# Patient Record
Sex: FEMALE | Race: WHITE | NOT HISPANIC OR LATINO | Employment: FULL TIME | URBAN - METROPOLITAN AREA
[De-identification: names, ages, dates, MRNs, and addresses within clinical notes are randomized per-mention and may not be internally consistent; named-entity substitution may affect disease eponyms.]

---

## 2017-04-21 ENCOUNTER — OFFICE VISIT (OUTPATIENT)
Dept: FAMILY MEDICINE CLINIC | Facility: CLINIC | Age: 32
End: 2017-04-21

## 2017-04-21 VITALS
OXYGEN SATURATION: 99 % | HEART RATE: 78 BPM | WEIGHT: 219 LBS | HEIGHT: 67 IN | DIASTOLIC BLOOD PRESSURE: 72 MMHG | SYSTOLIC BLOOD PRESSURE: 124 MMHG | BODY MASS INDEX: 34.37 KG/M2 | RESPIRATION RATE: 16 BRPM

## 2017-04-21 DIAGNOSIS — E66.9 OBESITY (BMI 30-39.9): ICD-10-CM

## 2017-04-21 DIAGNOSIS — T78.40XA ALLERGIC REACTION, INITIAL ENCOUNTER: Primary | ICD-10-CM

## 2017-04-21 DIAGNOSIS — L30.8 OTHER ECZEMA: ICD-10-CM

## 2017-04-21 LAB
ALBUMIN SERPL-MCNC: 4.2 G/DL (ref 3.2–4.8)
ALBUMIN/GLOB SERPL: 1.4 G/DL (ref 1.5–2.5)
ALP SERPL-CCNC: 122 U/L (ref 25–100)
ALT SERPL W P-5'-P-CCNC: 19 U/L (ref 7–40)
ANION GAP SERPL CALCULATED.3IONS-SCNC: 8 MMOL/L (ref 3–11)
ARTICHOKE IGE QN: 119 MG/DL (ref 0–130)
AST SERPL-CCNC: 25 U/L (ref 0–33)
BASOPHILS # BLD AUTO: 0.01 10*3/MM3 (ref 0–0.2)
BASOPHILS NFR BLD AUTO: 0.2 % (ref 0–1)
BILIRUB SERPL-MCNC: 0.5 MG/DL (ref 0.3–1.2)
BUN BLD-MCNC: 12 MG/DL (ref 9–23)
BUN/CREAT SERPL: 13.3 (ref 7–25)
CALCIUM SPEC-SCNC: 10.1 MG/DL (ref 8.7–10.4)
CHLORIDE SERPL-SCNC: 105 MMOL/L (ref 99–109)
CHOLEST SERPL-MCNC: 174 MG/DL (ref 0–200)
CO2 SERPL-SCNC: 28 MMOL/L (ref 20–31)
CREAT BLD-MCNC: 0.9 MG/DL (ref 0.6–1.3)
DEPRECATED RDW RBC AUTO: 43.6 FL (ref 37–54)
EOSINOPHIL # BLD AUTO: 0.1 10*3/MM3 (ref 0.1–0.3)
EOSINOPHIL NFR BLD AUTO: 2.3 % (ref 0–3)
ERYTHROCYTE [DISTWIDTH] IN BLOOD BY AUTOMATED COUNT: 13.3 % (ref 11.3–14.5)
GFR SERPL CREATININE-BSD FRML MDRD: 72 ML/MIN/1.73
GLOBULIN UR ELPH-MCNC: 3.1 GM/DL
GLUCOSE BLD-MCNC: 76 MG/DL (ref 70–100)
HCT VFR BLD AUTO: 39.9 % (ref 34.5–44)
HDLC SERPL-MCNC: 49 MG/DL (ref 40–60)
HGB BLD-MCNC: 12.7 G/DL (ref 11.5–15.5)
IMM GRANULOCYTES # BLD: 0 10*3/MM3 (ref 0–0.03)
IMM GRANULOCYTES NFR BLD: 0 % (ref 0–0.6)
LYMPHOCYTES # BLD AUTO: 1.86 10*3/MM3 (ref 0.6–4.8)
LYMPHOCYTES NFR BLD AUTO: 42 % (ref 24–44)
MCH RBC QN AUTO: 28.7 PG (ref 27–31)
MCHC RBC AUTO-ENTMCNC: 31.8 G/DL (ref 32–36)
MCV RBC AUTO: 90.3 FL (ref 80–99)
MONOCYTES # BLD AUTO: 0.41 10*3/MM3 (ref 0–1)
MONOCYTES NFR BLD AUTO: 9.3 % (ref 0–12)
NEUTROPHILS # BLD AUTO: 2.05 10*3/MM3 (ref 1.5–8.3)
NEUTROPHILS NFR BLD AUTO: 46.2 % (ref 41–71)
PLATELET # BLD AUTO: 234 10*3/MM3 (ref 150–450)
PMV BLD AUTO: 12.5 FL (ref 6–12)
POTASSIUM BLD-SCNC: 4.5 MMOL/L (ref 3.5–5.5)
PROT SERPL-MCNC: 7.3 G/DL (ref 5.7–8.2)
RBC # BLD AUTO: 4.42 10*6/MM3 (ref 3.89–5.14)
SODIUM BLD-SCNC: 141 MMOL/L (ref 132–146)
TRIGL SERPL-MCNC: 39 MG/DL (ref 0–150)
TSH SERPL DL<=0.05 MIU/L-ACNC: 0.79 MIU/ML (ref 0.35–5.35)
WBC NRBC COR # BLD: 4.43 10*3/MM3 (ref 3.5–10.8)

## 2017-04-21 PROCEDURE — 85025 COMPLETE CBC W/AUTO DIFF WBC: CPT | Performed by: FAMILY MEDICINE

## 2017-04-21 PROCEDURE — 99204 OFFICE O/P NEW MOD 45 MIN: CPT | Performed by: FAMILY MEDICINE

## 2017-04-21 PROCEDURE — 80061 LIPID PANEL: CPT | Performed by: FAMILY MEDICINE

## 2017-04-21 PROCEDURE — 84443 ASSAY THYROID STIM HORMONE: CPT | Performed by: FAMILY MEDICINE

## 2017-04-21 PROCEDURE — 36415 COLL VENOUS BLD VENIPUNCTURE: CPT | Performed by: FAMILY MEDICINE

## 2017-04-21 PROCEDURE — 80053 COMPREHEN METABOLIC PANEL: CPT | Performed by: FAMILY MEDICINE

## 2017-05-19 ENCOUNTER — OFFICE VISIT (OUTPATIENT)
Dept: FAMILY MEDICINE CLINIC | Facility: CLINIC | Age: 32
End: 2017-05-19

## 2017-05-19 VITALS
SYSTOLIC BLOOD PRESSURE: 112 MMHG | HEIGHT: 67 IN | OXYGEN SATURATION: 98 % | BODY MASS INDEX: 33.59 KG/M2 | DIASTOLIC BLOOD PRESSURE: 72 MMHG | WEIGHT: 214 LBS | HEART RATE: 70 BPM | TEMPERATURE: 97.9 F

## 2017-05-19 DIAGNOSIS — E66.1 MORBID DRUG-INDUCED OBESITY: Primary | ICD-10-CM

## 2017-05-19 PROCEDURE — 99213 OFFICE O/P EST LOW 20 MIN: CPT | Performed by: FAMILY MEDICINE

## 2017-05-19 RX ORDER — NALTREXONE HYDROCHLORIDE AND BUPROPION HYDROCHLORIDE 8; 90 MG/1; MG/1
2 TABLET, EXTENDED RELEASE ORAL 2 TIMES DAILY
Qty: 120 TABLET | Refills: 0 | Status: SHIPPED | OUTPATIENT
Start: 2017-05-19 | End: 2017-09-25

## 2017-05-19 RX ORDER — NALTREXONE HYDROCHLORIDE AND BUPROPION HYDROCHLORIDE 8; 90 MG/1; MG/1
TABLET, EXTENDED RELEASE ORAL
Refills: 0 | COMMUNITY
Start: 2017-04-21 | End: 2017-05-19 | Stop reason: SDUPTHER

## 2017-09-25 ENCOUNTER — OFFICE VISIT (OUTPATIENT)
Dept: ORTHOPEDIC SURGERY | Facility: CLINIC | Age: 32
End: 2017-09-25

## 2017-09-25 VITALS
BODY MASS INDEX: 33.12 KG/M2 | HEIGHT: 67 IN | SYSTOLIC BLOOD PRESSURE: 127 MMHG | WEIGHT: 211 LBS | DIASTOLIC BLOOD PRESSURE: 92 MMHG | HEART RATE: 61 BPM

## 2017-09-25 DIAGNOSIS — M79.672 LEFT FOOT PAIN: Primary | ICD-10-CM

## 2017-09-25 PROCEDURE — 99203 OFFICE O/P NEW LOW 30 MIN: CPT | Performed by: ORTHOPAEDIC SURGERY

## 2017-09-25 NOTE — PROGRESS NOTES
NEW PATIENT    Patient: Theresa Matthew  : 1985    Primary Care Provider: Ellen Hooper DO    Requesting Provider: As above    Pain of the Left Foot      History    Chief Complaint: Left foot pain    History of Present Illness: This is a very pleasant 32-year-old woman who reports that 5 months ago she stepped on broken glass at her home.  It was a broken glass from a picture frame.  She stepped on it on the plantar lateral aspect of the left foot.  She pulled out a big piece of glass, but then was unable to pull out a very small piece that she thinks worked into the foot.  Since that time she's had pain and a hard callused area on the plantar lateral aspect of the foot.  She has more pain with activity, better with rest.  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  She rates the pain at a 4 out of 10.  She has not had any other specific treatment.  No fever no chills, no drainage from the area.  Her tetanus booster is up-to-date.  No other injuries.    Current Outpatient Prescriptions on File Prior to Visit   Medication Sig Dispense Refill   • [DISCONTINUED] CONTRAVE 8-90 MG tablet Take 2 tablets by mouth 2 (Two) Times a Day. 120 tablet 0   • [DISCONTINUED] fluocinonide-emollient (LIDEX-E) 0.05 % cream Apply  topically 2 (Two) Times a Day. 60 g 0     No current facility-administered medications on file prior to visit.       No Known Allergies   Past Medical History:   Diagnosis Date   • Cancer     cervical      Past Surgical History:   Procedure Laterality Date   • CATARACT EXTRACTION     •  SECTION     • WISDOM TOOTH EXTRACTION       Family History   Problem Relation Age of Onset   • Diabetes Mother    • Stroke Father    • Heart attack Father       Social History     Social History   • Marital status:      Spouse name: N/A   • Number of children: N/A   • Years of education: N/A     Occupational History   • Not on file.     Social History Main Topics   • Smoking status: Never  "Smoker   • Smokeless tobacco: Never Used   • Alcohol use No   • Drug use: No   • Sexual activity: Defer     Other Topics Concern   • Not on file     Social History Narrative        Review of Systems   Constitutional: Positive for activity change.   HENT: Negative.    Eyes: Negative.    Respiratory: Negative.    Cardiovascular: Negative.    Gastrointestinal: Negative.    Endocrine: Negative.    Genitourinary: Negative.    Musculoskeletal: Positive for arthralgias.   Skin: Negative.    Allergic/Immunologic: Negative.    Neurological: Negative.    Hematological: Negative.    Psychiatric/Behavioral: Negative.        The following portions of the patient's history were reviewed and updated as appropriate: allergies, current medications, past family history, past medical history, past social history, past surgical history and problem list.    Physical Exam:   /92  Pulse 61  Ht 66.93\" (170 cm)  Wt 211 lb (95.7 kg)  BMI 33.12 kg/m2  GENERAL: Body habitus: obese    Lower extremity edema: Left: none; Right: none    Varicose veins:  Left: none; Right: none    Gait: antalgic     Mental Status:  awake and alert; oriented to person, place, and time    Voice:  clear  SKIN:  Normal and warm and dry    Hair Growth:  Right:normal; Left:  normal  NAILS: Toenails: normal  HEENT: Head: Normocephalic, atraumatic,  without obvious abnormality.  eye: normal external eye, no icterus  ears: normal external ears  nose: normal external nose  pharynx: dental hygiene adequate  PULM:  Repiratory effort normal  CV:  Dorsalis Pedis:  Right: 2+; Left:2+    Posterior Tibial: Right:2+; Left:2+    Capillary Refill:  Brisk  MSK:  Hand:right handed      Tibia:  Right:  non tender; Left:  non tender      Ankle:  Right: non tender; Left:  non tender      Foot:  Right:  non tender and Moderate calluses under the second and fifth metatarsal head, under the great toe, but nontender; Left:  Some calluses under the second metatarsal head and great " toe, but not tender.  Large and very tender thick keratotic lesion under the fifth metatarsal just distal to the base of the fifth metatarsal.  No signs of infection, no erythema, I cannot palpate any foreign body.  No swelling in the area.      NEURO: ity sensation: intact       Calf Atrophy:none    Motor Function: all 5/5         Medical Decision Making    Data Review:   ordered and reviewed x-rays today    Assessment and Plan/ Diagnosis/Treatment options:   1. Left foot pain  I did an x-ray today I don't see any definite foreign body.  I also debrided the keratotic area extensively after a sterile prep with alcohol.  I used a scalpel and pickups.  Sometimes in the very thick keratotic layer I thought I reminded seen some shiny bits that might have been glass.  I debrided it all the way down to normal skin tissue.  I did not draw any blood, this was all thick keratin.  I did not see any punctate lesions, although this could be a plantar wart.  It was very tender to palpation, but I did not palpate any hard foreign body.  I carefully scraped the area with the scalpel and did not feel anything felt like glass.  I did not see any dark structures -nothing that looks like a foreign body.  I removed all of the callused area.  I explained to her that before considering surgery I would do an MRI to see if there is anything that looks like a foreign body in the area.  I explained that sometimes glass does show up on x-ray especially if it has a lead content, sometimes it does not.  If it's a very small piece, surgery to find it can be extraordinarily difficult.  Even if it's a large piece surgery can be difficult in the foot.  Sometimes I have operated to look for a piece of glass and even after extensive exploration through the tissues it keeps eluding you.  Before we make any type of surgical decision I think we need more information.  Moreover, it's possible this is a normal callus or even a plantar wart, not a foreign  body response.  She is insistent that it's glass, but I want to be cautious and not make her worse with surgical intervention.  I will see her back following the MRI.  - XR Foot 2 View Left; Future  - MRI Foot Left Without Contrast; Future

## 2018-08-29 ENCOUNTER — OFFICE VISIT (OUTPATIENT)
Dept: FAMILY MEDICINE CLINIC | Facility: CLINIC | Age: 33
End: 2018-08-29

## 2018-08-29 VITALS
WEIGHT: 213 LBS | TEMPERATURE: 97.2 F | HEART RATE: 66 BPM | OXYGEN SATURATION: 98 % | BODY MASS INDEX: 33.43 KG/M2 | RESPIRATION RATE: 16 BRPM | HEIGHT: 67 IN | DIASTOLIC BLOOD PRESSURE: 68 MMHG | SYSTOLIC BLOOD PRESSURE: 98 MMHG

## 2018-08-29 DIAGNOSIS — R10.11 RIGHT UPPER QUADRANT ABDOMINAL PAIN: Primary | ICD-10-CM

## 2018-08-29 DIAGNOSIS — R11.0 NAUSEA: ICD-10-CM

## 2018-08-29 LAB
ALBUMIN SERPL-MCNC: 4.34 G/DL (ref 3.2–4.8)
ALBUMIN/GLOB SERPL: 1.4 G/DL (ref 1.5–2.5)
ALP SERPL-CCNC: 101 U/L (ref 25–100)
ALT SERPL W P-5'-P-CCNC: 20 U/L (ref 7–40)
ANION GAP SERPL CALCULATED.3IONS-SCNC: 3 MMOL/L (ref 3–11)
AST SERPL-CCNC: 23 U/L (ref 0–33)
BASOPHILS # BLD AUTO: 0.01 10*3/MM3 (ref 0–0.2)
BASOPHILS NFR BLD AUTO: 0.2 % (ref 0–1)
BILIRUB SERPL-MCNC: 0.6 MG/DL (ref 0.3–1.2)
BUN BLD-MCNC: 14 MG/DL (ref 9–23)
BUN/CREAT SERPL: 13.6 (ref 7–25)
CALCIUM SPEC-SCNC: 9.3 MG/DL (ref 8.7–10.4)
CHLORIDE SERPL-SCNC: 107 MMOL/L (ref 99–109)
CO2 SERPL-SCNC: 29 MMOL/L (ref 20–31)
CREAT BLD-MCNC: 1.03 MG/DL (ref 0.6–1.3)
DEPRECATED RDW RBC AUTO: 44.7 FL (ref 37–54)
EOSINOPHIL # BLD AUTO: 0.09 10*3/MM3 (ref 0–0.3)
EOSINOPHIL NFR BLD AUTO: 2.2 % (ref 0–3)
ERYTHROCYTE [DISTWIDTH] IN BLOOD BY AUTOMATED COUNT: 13.7 % (ref 11.3–14.5)
GFR SERPL CREATININE-BSD FRML MDRD: 61 ML/MIN/1.73
GLOBULIN UR ELPH-MCNC: 3.1 GM/DL
GLUCOSE BLD-MCNC: 86 MG/DL (ref 70–100)
HAV IGM SERPL QL IA: NORMAL
HBV CORE IGM SERPL QL IA: NORMAL
HBV SURFACE AG SERPL QL IA: NORMAL
HCT VFR BLD AUTO: 41.4 % (ref 34.5–44)
HCV AB SER DONR QL: NORMAL
HGB BLD-MCNC: 13.2 G/DL (ref 11.5–15.5)
IMM GRANULOCYTES # BLD: 0 10*3/MM3 (ref 0–0.03)
IMM GRANULOCYTES NFR BLD: 0 % (ref 0–0.6)
LIPASE SERPL-CCNC: 52 U/L (ref 6–51)
LYMPHOCYTES # BLD AUTO: 1.64 10*3/MM3 (ref 0.6–4.8)
LYMPHOCYTES NFR BLD AUTO: 40.4 % (ref 24–44)
MCH RBC QN AUTO: 28.9 PG (ref 27–31)
MCHC RBC AUTO-ENTMCNC: 31.9 G/DL (ref 32–36)
MCV RBC AUTO: 90.8 FL (ref 80–99)
MONOCYTES # BLD AUTO: 0.3 10*3/MM3 (ref 0–1)
MONOCYTES NFR BLD AUTO: 7.4 % (ref 0–12)
NEUTROPHILS # BLD AUTO: 2.02 10*3/MM3 (ref 1.5–8.3)
NEUTROPHILS NFR BLD AUTO: 49.8 % (ref 41–71)
PLATELET # BLD AUTO: 216 10*3/MM3 (ref 150–450)
PMV BLD AUTO: 13.1 FL (ref 6–12)
POTASSIUM BLD-SCNC: 4.7 MMOL/L (ref 3.5–5.5)
PROT SERPL-MCNC: 7.4 G/DL (ref 5.7–8.2)
RBC # BLD AUTO: 4.56 10*6/MM3 (ref 3.89–5.14)
SODIUM BLD-SCNC: 139 MMOL/L (ref 132–146)
WBC NRBC COR # BLD: 4.06 10*3/MM3 (ref 3.5–10.8)

## 2018-08-29 PROCEDURE — 80074 ACUTE HEPATITIS PANEL: CPT | Performed by: NURSE PRACTITIONER

## 2018-08-29 PROCEDURE — 36415 COLL VENOUS BLD VENIPUNCTURE: CPT | Performed by: NURSE PRACTITIONER

## 2018-08-29 PROCEDURE — 99214 OFFICE O/P EST MOD 30 MIN: CPT | Performed by: NURSE PRACTITIONER

## 2018-08-29 PROCEDURE — 85025 COMPLETE CBC W/AUTO DIFF WBC: CPT | Performed by: NURSE PRACTITIONER

## 2018-08-29 PROCEDURE — 83690 ASSAY OF LIPASE: CPT | Performed by: NURSE PRACTITIONER

## 2018-08-29 PROCEDURE — 80053 COMPREHEN METABOLIC PANEL: CPT | Performed by: NURSE PRACTITIONER

## 2018-08-29 NOTE — PROGRESS NOTES
Subjective   Theresa Matthew is a 33 y.o. female.     Abdominal Pain   This is a new problem. Episode onset: 6 months ago. The onset quality is gradual. The problem occurs intermittently. The problem has been gradually worsening. The pain is located in the RUQ. The pain is moderate. The quality of the pain is aching, colicky, cramping and a sensation of fullness. The abdominal pain radiates to the epigastric region. Associated symptoms include nausea and vomiting. Pertinent negatives include no arthralgias, constipation, diarrhea, dysuria, fever, frequency, headaches, hematochezia, hematuria, melena or myalgias.       The following portions of the patient's history were reviewed and updated as appropriate: allergies, current medications, past family history, past medical history, past social history, past surgical history and problem list.     No Known Allergies     Review of Systems   Constitutional: Positive for appetite change. Negative for chills, diaphoresis, fatigue, fever and unexpected weight change.   HENT: Negative.    Respiratory: Negative.    Cardiovascular: Negative.    Gastrointestinal: Positive for abdominal distention, abdominal pain, nausea and vomiting. Negative for blood in stool, constipation, diarrhea, hematochezia and melena.   Genitourinary: Negative for dysuria, flank pain, frequency, hematuria and pelvic pain.   Musculoskeletal: Negative for arthralgias and myalgias.   Skin: Negative for rash.   Neurological: Negative for dizziness and headaches.       Objective   Physical Exam   Constitutional: She is oriented to person, place, and time. Vital signs are normal. She appears well-developed and well-nourished. She is cooperative. She does not appear ill. No distress.   HENT:   Mouth/Throat: Uvula is midline and mucous membranes are normal.   Eyes: No scleral icterus.   Cardiovascular: Normal rate, regular rhythm and normal heart sounds.    Pulmonary/Chest: Effort normal and breath sounds  normal.   Abdominal: Soft. Normal appearance and bowel sounds are normal. She exhibits no distension and no mass. There is no hepatosplenomegaly. There is tenderness in the right upper quadrant. There is no rigidity, no rebound, no guarding, no CVA tenderness, no tenderness at McBurney's point and negative Franz's sign.   Neurological: She is alert and oriented to person, place, and time.   Skin: Skin is warm, dry and intact. No rash noted. She is not diaphoretic.   Psychiatric: She has a normal mood and affect. Her behavior is normal. Judgment and thought content normal.   Vitals reviewed.    Vitals:    08/29/18 0848   BP: 98/68   Pulse: 66   Resp: 16   Temp: 97.2 °F (36.2 °C)   SpO2: 98%   Body mass index is 33.43 kg/m².     Assessment/Plan   Theresa was seen today for pain.    Diagnoses and all orders for this visit:    Right upper quadrant abdominal pain  -     US Gallbladder; Future  -     NM HIDA Scan With Pharmacological Intervention; Future  -     Comprehensive Metabolic Panel  -     CBC Auto Differential  -     Hepatitis Panel, Acute  -     Lipase    Nausea  -     US Gallbladder; Future  -     NM HIDA Scan With Pharmacological Intervention; Future  -     Comprehensive Metabolic Panel  -     CBC Auto Differential  -     Hepatitis Panel, Acute  -     Lipase       Discussed the nature of the medical condition(s) risks, complications, implications, management, safe and proper use of medications. Encouraged medication compliance, and keeping scheduled follow up appointments with me and any other providers.   Laboratory testing ordered today. Further recommendations after lab evaluation.

## 2018-09-12 ENCOUNTER — HOSPITAL ENCOUNTER (OUTPATIENT)
Dept: NUCLEAR MEDICINE | Facility: HOSPITAL | Age: 33
Discharge: HOME | End: 2018-09-12

## 2018-09-12 ENCOUNTER — HOSPITAL ENCOUNTER (OUTPATIENT)
Dept: ULTRASOUND IMAGING | Facility: HOSPITAL | Age: 33
Discharge: HOME | End: 2018-09-12
Admitting: NURSE PRACTITIONER

## 2018-09-12 DIAGNOSIS — R11.0 NAUSEA: ICD-10-CM

## 2018-09-12 DIAGNOSIS — R10.11 RIGHT UPPER QUADRANT ABDOMINAL PAIN: ICD-10-CM

## 2018-09-12 PROCEDURE — 25010000002 SINCALIDE PER 5 MCG: Performed by: NURSE PRACTITIONER

## 2018-09-12 PROCEDURE — 78227 HEPATOBIL SYST IMAGE W/DRUG: CPT

## 2018-09-12 PROCEDURE — A9537 TC99M MEBROFENIN: HCPCS | Performed by: NURSE PRACTITIONER

## 2018-09-12 PROCEDURE — 76705 ECHO EXAM OF ABDOMEN: CPT

## 2018-09-12 PROCEDURE — 0 TECHNETIUM TC 99M MEBROFENIN KIT: Performed by: NURSE PRACTITIONER

## 2018-09-12 RX ORDER — KIT FOR THE PREPARATION OF TECHNETIUM TC 99M MEBROFENIN 45 MG/10ML
1 INJECTION, POWDER, LYOPHILIZED, FOR SOLUTION INTRAVENOUS
Status: COMPLETED | OUTPATIENT
Start: 2018-09-12 | End: 2018-09-12

## 2018-09-12 RX ADMIN — MEBROFENIN 1 DOSE: 45 INJECTION, POWDER, LYOPHILIZED, FOR SOLUTION INTRAVENOUS at 09:25

## 2018-09-12 RX ADMIN — SINCALIDE 1.9 MCG: 5 INJECTION, POWDER, LYOPHILIZED, FOR SOLUTION INTRAVENOUS at 10:23

## 2018-09-13 ENCOUNTER — TELEPHONE (OUTPATIENT)
Dept: FAMILY MEDICINE CLINIC | Facility: CLINIC | Age: 33
End: 2018-09-13

## 2018-09-13 DIAGNOSIS — K82.8 BILIARY DYSKINESIA: Primary | ICD-10-CM

## 2018-09-13 DIAGNOSIS — R94.8 ABNORMAL BILIARY HIDA SCAN: ICD-10-CM

## 2018-09-13 NOTE — TELEPHONE ENCOUNTER
Spoke with patient, informed of gallbladder ultrasound and hida scan and discussed findings, questions answered. Recommended surgery consult due to gallbladder function of 5%. Patient agreeable with plan of care. Referral placed.

## 2020-08-31 ENCOUNTER — TELEPHONE (OUTPATIENT)
Dept: FAMILY MEDICINE CLINIC | Facility: CLINIC | Age: 35
End: 2020-08-31

## 2020-08-31 NOTE — TELEPHONE ENCOUNTER
PATIENT CALLING IN TO REPORT SYMPTOMS OF CHEST/NECK ITCHINESS, REDNESS, RASH, SEVERAL LESIONS.     SHE IS REQUESTING A TOPICAL OINTMENT OR CREAM SENT TO THE PHARMACY.    [facility]/pharmacy #[id number] - [place], [place] - [address]  - [phone]  - [phone]    CALLBACK NUMBER [phone]

## 2020-09-01 ENCOUNTER — TELEMEDICINE (OUTPATIENT)
Dept: FAMILY MEDICINE CLINIC | Facility: CLINIC | Age: 35
End: 2020-09-01

## 2020-09-01 DIAGNOSIS — R21 RASH: Primary | ICD-10-CM

## 2020-09-01 PROCEDURE — 99213 OFFICE O/P EST LOW 20 MIN: CPT | Performed by: NURSE PRACTITIONER

## 2020-09-01 RX ORDER — METHYLPREDNISOLONE 4 MG/1
TABLET ORAL
Qty: 21 TABLET | Refills: 0 | Status: SHIPPED | OUTPATIENT
Start: 2020-09-01

## 2020-09-01 NOTE — PROGRESS NOTES
Irena Matthew is a 35 y.o. female.       This was an audio and video enabled telemedicine encounter.    History of Present Illness  Complains of pruritic rash on chest and neck. Started on Phentermine June 1st. Developed mild rash on chest in July. Rash was pruritic.   Rash never fully resolved and progressed.  She stopped the phentermine on Friday.  Phentermine prescribed by Maribel at [facility]. Rash is described as red with a raised center. Denies bed bugs. No bed partners. No pets in the bed. No new detergents or soaps.    Outpatient Encounter Medications as of 9/1/2020   Medication Sig Dispense Refill   • methylPREDNISolone (MEDROL) 4 MG dose pack Take as directed on package instructions. 21 tablet 0     No facility-administered encounter medications on file as of 9/1/2020.        The following portions of the patient's history were reviewed and updated as appropriate: allergies, current medications, past family history, past medical history, past social history, past surgical history and problem list.    Review of Systems   Constitutional: Negative for appetite change, fever, unexpected weight gain and unexpected weight loss.   HENT: Negative for congestion, nosebleeds, sore throat and trouble swallowing.    Eyes: Negative for visual disturbance.   Respiratory: Negative for cough, shortness of breath and wheezing.    Cardiovascular: Negative for chest pain, palpitations and leg swelling.   Gastrointestinal: Negative for abdominal pain, blood in stool, constipation, diarrhea, nausea and vomiting.   Endocrine: Negative for polydipsia, polyphagia and polyuria.   Genitourinary: Negative for dysuria, frequency and hematuria.   Musculoskeletal: Negative for arthralgias, joint swelling and myalgias.   Skin: Positive for rash.   Neurological: Negative for dizziness, seizures, syncope and numbness.   Hematological: Negative for adenopathy. Does not bruise/bleed easily.    Psychiatric/Behavioral: Negative for behavioral problems, sleep disturbance and depressed mood. The patient is not nervous/anxious.        Objective     There were no vitals taken for this visit.    Physical Exam   Constitutional: She is oriented to person, place, and time. She appears well-developed and well-nourished. No distress.   HENT:   Head: Normocephalic and atraumatic.   Nose: Nose normal.   Eyes: Conjunctivae are normal. Right eye exhibits no discharge. Left eye exhibits no discharge.   Neck: Normal range of motion.   Pulmonary/Chest: Effort normal. No respiratory distress.   Musculoskeletal: Normal range of motion.   Neurological: She is alert and oriented to person, place, and time.   Skin: Rash noted. She is not diaphoretic. No pallor.   4 quarter size areas of erythema with center papule   Psychiatric: She has a normal mood and affect. Her behavior is normal. Judgment and thought content normal.         Assessment/Plan   Theresa was seen today for rash.    Diagnoses and all orders for this visit:    Rash  -     methylPREDNISolone (MEDROL) 4 MG dose pack; Take as directed on package instructions.    This does not look like hives.  Will treat with steroids and antihistamines. Try to identify offending agent. Consider insect bites. Patient to discuss re-starting phentermine with provider who prescribed it. Follow up as needed. May need to see Dermatology.

## 2025-07-08 ENCOUNTER — TELEPHONE (OUTPATIENT)
Dept: PEDIATRICS | Facility: OTHER | Age: 40
End: 2025-07-08

## 2025-07-08 NOTE — TELEPHONE ENCOUNTER
Caller: Theresa Matthew    Relationship to patient: Self    Best call back number: [phone]     Chief complaint: HEART PALPITATIONS    Patient directed to call 911 or go to their nearest emergency room.     Patient verbalized understanding: [x] Yes  [] No  If no, why?    Additional notes:PATIENT STATED SHE WOULD GO TO THE EMERGENCY ROOM  . SHE DID ALSO SET UP A NEW PATIENT APPT ON 7/10/25

## 2025-07-16 ENCOUNTER — OFFICE VISIT (OUTPATIENT)
Dept: FAMILY MEDICINE CLINIC | Facility: CLINIC | Age: 40
End: 2025-07-16
Payer: COMMERCIAL

## 2025-07-16 VITALS
DIASTOLIC BLOOD PRESSURE: 68 MMHG | BODY MASS INDEX: 36.38 KG/M2 | SYSTOLIC BLOOD PRESSURE: 118 MMHG | OXYGEN SATURATION: 99 % | TEMPERATURE: 98.6 F | HEIGHT: 67 IN | HEART RATE: 67 BPM | WEIGHT: 231.8 LBS | RESPIRATION RATE: 20 BRPM

## 2025-07-16 DIAGNOSIS — R13.10 DYSPHAGIA, UNSPECIFIED TYPE: ICD-10-CM

## 2025-07-16 DIAGNOSIS — R00.2 PALPITATIONS: Primary | ICD-10-CM

## 2025-07-16 PROCEDURE — 99204 OFFICE O/P NEW MOD 45 MIN: CPT | Performed by: HOSPITALIST

## 2025-07-16 NOTE — ASSESSMENT & PLAN NOTE
- Reports intermittent pain and discomfort when swallowing, which has improved but not fully resolved.  - Physical exam of the neck and thyroid was unremarkable.  - If symptoms persist, a referral to a gastroenterologist for further evaluation will be considered.

## 2025-07-16 NOTE — PROGRESS NOTES
New Patient Office Visit      Patient Name: Theresa Matthew  : 1985   MRN: [id number]     Chief Complaint:  Establish Care and Palpitations     History of Present Illness: Theresa Matthew is a 40 y.o. female who is here today for  initial evaluation .      History of Present Illness  The patient presents to establish care.    She reports experiencing heart palpitations for the past 6 months, which she attributes to increased stress and anxiety. Despite being an active dance instructor, she notices these palpitations even during periods of rest. She describes the sensation as similar to contractions in her chest, accompanied by a fluttering feeling on the left side of her throat. An emergency room visit resulted in normal EKG and chest x-ray results, but a cardiologist identified a consistently irregular heartbeat. She also reported elevated blood pressure, a new development for her. On Monday, she experienced difficulty swallowing, which she managed by increasing her water intake. The following day, the swelling improved but did not completely resolve. Today, she reports no difficulty swallowing but still feels tension in the back of her left neck. She also mentions feeling fatigued and drained, despite getting adequate sleep. She is currently taking several supplements, including a natural GLP support supplement. Her recent lab results showed slightly low potassium and magnesium levels, which she was advised could affect her heart rhythm.    She has a history of cervical cancer at age 21, treated with chemotherapy and radiation, which led to perimenopause and subsequent weight loss challenges. She is seeking advice on weight loss strategies.        Subjective     Subjective          The following portions of the patient's history were reviewed and updated as appropriate: allergies, current medications, past family history, past medical history, past social history, past surgical history and problem  list.    Allergy:   No Known Allergies     Current Medications:   No current outpatient medications on file.     No current facility-administered medications for this visit.       Past Medical History:  Past Medical History:   Diagnosis Date    Cancer     cervical     Cataract 2012    Surgically removed    History of medical problems 2025    Heart palpitations       Past Surgical History:  Past Surgical History:   Procedure Laterality Date    CATARACT EXTRACTION       SECTION      COLONOSCOPY  2006    WISDOM TOOTH EXTRACTION         Social History:  Social History     Socioeconomic History    Marital status:    Tobacco Use    Smoking status: Never     Passive exposure: Past    Smokeless tobacco: Never   Vaping Use    Vaping status: Never Used    Passive vaping exposure: Yes   Substance and Sexual Activity    Alcohol use: No    Drug use: No    Sexual activity: Not Currently     Partners: Male     Birth control/protection: None       Family History:  Family History   Problem Relation Age of Onset    Diabetes Mother     Stroke Father     Heart attack Father     Diabetes Father     Heart disease Paternal Grandmother     Hypertension Paternal Grandmother     Stroke Paternal Grandmother        Objective     Objective     Physical Exam:  Physical Exam  Vitals and nursing note reviewed.   Constitutional:       Appearance: Normal appearance.   HENT:      Head: Normocephalic and atraumatic.      Mouth/Throat:      Mouth: Mucous membranes are moist.   Eyes:      Pupils: Pupils are equal, round, and reactive to light.   Cardiovascular:      Rate and Rhythm: Normal rate and regular rhythm.      Heart sounds: Normal heart sounds.   Pulmonary:      Effort: Pulmonary effort is normal.      Breath sounds: Normal breath sounds.   Abdominal:      General: Bowel sounds are normal.      Palpations: Abdomen is soft.   Musculoskeletal:         General: Normal range of motion.      Cervical back: Normal range of motion.  "  Skin:     General: Skin is warm and dry.   Neurological:      General: No focal deficit present.      Mental Status: She is alert and oriented to person, place, and time.   Psychiatric:         Mood and Affect: Mood normal.         Behavior: Behavior normal.         Vital Signs:   /68 (BP Location: Right arm, Patient Position: Sitting, Cuff Size: Adult)   Pulse 67   Temp 98.6 °F (37 °C) (Temporal)   Resp 20   Ht 170 cm (66.93\")   Wt 105 kg (231 lb 12.8 oz)   SpO2 99%   BMI 36.38 kg/m²            PHQ-9 Score  PHQ-9 Total Score:      Lab Review  No visits with results within 3 Month(s) from this visit.   Latest known visit with results is:   Office Visit on 08/29/2018   Component Date Value Ref Range Status    Glucose 08/29/2018 86  70 - 100 mg/dL Final    BUN 08/29/2018 14  9 - 23 mg/dL Final    Creatinine 08/29/2018 1.03  0.60 - 1.30 mg/dL Final    Sodium 08/29/2018 139  132 - 146 mmol/L Final    Potassium 08/29/2018 4.7  3.5 - 5.5 mmol/L Final    Chloride 08/29/2018 107  99 - 109 mmol/L Final    CO2 08/29/2018 29.0  20.0 - 31.0 mmol/L Final    Calcium 08/29/2018 9.3  8.7 - 10.4 mg/dL Final    Total Protein 08/29/2018 7.4  5.7 - 8.2 g/dL Final    Albumin 08/29/2018 4.34  3.20 - 4.80 g/dL Final    ALT (SGPT) 08/29/2018 20  7 - 40 U/L Final    AST (SGOT) 08/29/2018 23  0 - 33 U/L Final    Alkaline Phosphatase 08/29/2018 101 (H)  25 - 100 U/L Final    Total Bilirubin 08/29/2018 0.6  0.3 - 1.2 mg/dL Final    eGFR Non  Amer 08/29/2018 61  >60 mL/min/1.73 Final    Globulin 08/29/2018 3.1  gm/dL Final    A/G Ratio 08/29/2018 1.4 (L)  1.5 - 2.5 g/dL Final    BUN/Creatinine Ratio 08/29/2018 13.6  7.0 - 25.0 Final    Anion Gap 08/29/2018 3.0  3.0 - 11.0 mmol/L Final    WBC 08/29/2018 4.06  3.50 - 10.80 10*3/mm3 Final    RBC 08/29/2018 4.56  3.89 - 5.14 10*6/mm3 Final    Hemoglobin 08/29/2018 13.2  11.5 - 15.5 g/dL Final    Hematocrit 08/29/2018 41.4  34.5 - 44.0 % Final    MCV 08/29/2018 90.8  80.0 - " 99.0 fL Final    MCH 08/29/2018 28.9  27.0 - 31.0 pg Final    MCHC 08/29/2018 31.9 (L)  32.0 - 36.0 g/dL Final    RDW 08/29/2018 13.7  11.3 - 14.5 % Final    RDW-SD 08/29/2018 44.7  37.0 - 54.0 fl Final    MPV 08/29/2018 13.1 (H)  6.0 - 12.0 fL Final    Platelets 08/29/2018 216  150 - 450 10*3/mm3 Final    Neutrophil % 08/29/2018 49.8  41.0 - 71.0 % Final    Lymphocyte % 08/29/2018 40.4  24.0 - 44.0 % Final    Monocyte % 08/29/2018 7.4  0.0 - 12.0 % Final    Eosinophil % 08/29/2018 2.2  0.0 - 3.0 % Final    Basophil % 08/29/2018 0.2  0.0 - 1.0 % Final    Immature Grans % 08/29/2018 0.0  0.0 - 0.6 % Final    Neutrophils, Absolute 08/29/2018 2.02  1.50 - 8.30 10*3/mm3 Final    Lymphocytes, Absolute 08/29/2018 1.64  0.60 - 4.80 10*3/mm3 Final    Monocytes, Absolute 08/29/2018 0.30  0.00 - 1.00 10*3/mm3 Final    Eosinophils, Absolute 08/29/2018 0.09  0.00 - 0.30 10*3/mm3 Final    Basophils, Absolute 08/29/2018 0.01  0.00 - 0.20 10*3/mm3 Final    Immature Grans, Absolute 08/29/2018 0.00  0.00 - 0.03 10*3/mm3 Final    Hepatitis B Surface Ag 08/29/2018 Non-Reactive  Non-Reactive Final    Hep A IgM 08/29/2018 Non-Reactive  Non-Reactive Final    Results may be falsely decreased if patient taking Biotin.    Hep B C IgM 08/29/2018 Non-Reactive  Non-Reactive Final    Results may be falsely decreased if patient taking Biotin.    Hepatitis C Ab 08/29/2018 Non-Reactive  Non-Reactive Final    Lipase 08/29/2018 52 (H)  6 - 51 U/L Final        Radiology Results  XR Chest 1 View  Result Date: 7/10/2025  Impression: Unremarkable portable chest. Images reviewed, interpreted, and dictated by Dr. Justus Xiong. Transcribed by Jeromy Travis.       Assessment / Plan         Assessment and Plan     Diagnoses and all orders for this visit:    1. Palpitations (Primary)  Assessment & Plan:  - Symptoms include intermittent palpitations described as contractions in the chest, with a fluttering sensation extending to the left side of the  throat.  - Previous ER visit included an EKG and chest x-ray, both of which were clear  - A Holter monitor will be ordered to record heart rate over 48 hours and identify any irregular rhythms. An echocardiogram will also be scheduled to assess heart function and ejection fraction.  - Advised to report any changes in symptoms, particularly related to swallowing difficulties.    Orders:  -     Holter monitor - 48 hour; Future  -     Adult Transthoracic Echo Complete W/ Cont if Necessary Per Protocol; Future    2. Dysphagia, unspecified type  Assessment & Plan:  - Reports intermittent pain and discomfort when swallowing, which has improved but not fully resolved.  - Physical exam of the neck and thyroid was unremarkable.  - If symptoms persist, a referral to a gastroenterologist for further evaluation will be considered.          Class 2 Severe Obesity (BMI >=35 and <=39.9). Obesity-related health conditions include the following: none. Obesity is unchanged. BMI is is above average; BMI management plan is completed. We discussed portion control, increasing exercise, and Information on healthy weight added to patient's after visit summary.       Health Maintenance  Health Maintenance:   Health Maintenance Due   Topic Date Due    PAP SMEAR  Never done    ANNUAL PHYSICAL  Never done    MAMMOGRAM  Never done        Meds ordered during this visit  No orders of the defined types were placed in this encounter.      Meds stopped during this visit:  Medications Discontinued During This Encounter   Medication Reason    methylPREDNISolone (MEDROL) 4 MG dose pack *Therapy completed        Patient was given instructions and counseling regarding her condition or for health maintenance advice. Please see specific information pulled into the AVS if appropriate.     Follow Up   Return in about 1 month (around 8/16/2025) for f/u palpitations.    Aniya Travis, DO  [facility]     Dictated Utilizing Dragon  Dictation: Part of this note may be an electronic transcription/translation of spoken language to printed text using the Dragon Dictation System.    Patient or patient representative verbalized consent for the use of Ambient Listening during the visit with  Aniya Travis DO for chart documentation. 7/16/2025  14:22 EDT      This document has been electronically signed by Aniya Travis DO   July 16, 2025 14:22 EDT

## 2025-07-16 NOTE — ASSESSMENT & PLAN NOTE
- Symptoms include intermittent palpitations described as contractions in the chest, with a fluttering sensation extending to the left side of the throat.  - Previous ER visit included an EKG and chest x-ray, both of which were clear  - A Holter monitor will be ordered to record heart rate over 48 hours and identify any irregular rhythms. An echocardiogram will also be scheduled to assess heart function and ejection fraction.  - Advised to report any changes in symptoms, particularly related to swallowing difficulties.

## 2025-08-01 DIAGNOSIS — R00.2 PALPITATIONS: Primary | ICD-10-CM

## 2025-08-27 DIAGNOSIS — I49.1 PAC (PREMATURE ATRIAL CONTRACTION): ICD-10-CM

## 2025-08-27 DIAGNOSIS — R00.2 PALPITATIONS: Primary | ICD-10-CM

## 2025-08-27 LAB
CV ZIO BASELINE AVG BPM: 84
CV ZIO BASELINE BPM HIGH: 170
CV ZIO BASELINE BPM LOW: 61